# Patient Record
(demographics unavailable — no encounter records)

---

## 2024-10-07 NOTE — ASSESSMENT
[With Patient/Caregiver] : With Patient/Caregiver [Designated Health Care Proxy] : Designated Health Care Proxy [DNR] : DNR [DNI] : DNI [FreeTextEntry1] : Assessment: 51-year-old, developmentally delayed, day +1 cycle 7 tafa/Rev for primary refractory DLBCL.   Course complicated by large pleural effusion (re-accumulated 3/25/24), CNS disease, Course further complicated by asymptomatic absolute eosinophilia (active)    Treatment Hx:  RCHOP  (2/2/24) + MTX(3.5g/mm) 2/16/24 (pleural effusion was drained for this infusion),  RCHOP (2/21/24), IT MTX ,  RCHOP (3/17/24), IT 3/28/24 L.P. (2/1/24): total protein 124. This is a marked decrease from Dx and supports CNS involvement of lymphoma at Dx. RCHOP (cycle 4): 4/4/24 Tafa/Rev: 4/15/24 -  Hussein will not consider hospitalization -- even for one day and therefore his decision maker does not wish to change to bi-specific therapy  PMHx: HTN, AODM, left AKA, right transmetarsal amputation, diabetic neuropathy.   Plan: Heme: Continue Tafa/Rev - rev at reduced dose given pre-existing neuropathy from DM. 14days/month Tafa days cycles 4 - 12 are q 14 days ASA 81mg daily  We are no longer pursing IT MTX.   Clonoseq: clone tracking is not feasible.   Radiographs: CT (1/30/24) large left pleural effusion. brain MRI (2/5/24): negative. CT (03/28/2024) -- before cycle 4 RCHOP CT (06/27/2024) -- pended below   DM: A1c 9.7%, As per brother he is going to stop by PCP office today for insulin refill   Code Status: discussed code status with Hussein and his brother: code status: DNR/DNI  Addendum I: CT (06/27/24):   COMPARISON: CT 3/28/2024 and 1/25/2024  FINDINGS: CHEST: LUNGS AND LARGE AIRWAYS: Mild secretions within the trachea. Atelectasis within the left lower lobe. PLEURA: Moderate left pleural effusion, decreased from 3/28/2024. VESSELS: Within normal limits. HEART: Heart size is normal. No pericardial effusion. MEDIASTINUM AND RAMIREZ: No lymphadenopathy. CHEST WALL AND LOWER NECK: Within normal limits.  ABDOMEN AND PELVIS: LIVER: Within normal limits. BILE DUCTS: Normal caliber. GALLBLADDER: Within normal limits. SPLEEN: Hypodense splenic mass measures 3.6 x 3.5 cm, previously 4.8 x 4.8 cm. PANCREAS: Within normal limits. ADRENALS: Normal right adrenal gland. Left adrenal gland nodule is confluent with the left retroperitoneal soft tissue described below. KIDNEYS/URETERS: Within normal limits.  BLADDER: Within normal limits. REPRODUCTIVE ORGANS: Prostate within normal limits.  BOWEL: No bowel obstruction. Appendix is normal PERITONEUM/RETROPERITONEUM: Continued interval decrease in left retroperitoneal soft tissue measures 5.7 x 1.4 cm, previously 6.6 x 2.4 cm. VESSELS: Within normal limits. LYMPH NODES: No lymphadenopathy. ABDOMINAL WALL: Small fat-containing umbilical hernia. BONES: Degenerative changes of the spine.  IMPRESSION: *  Continued interval decrease in size of splenic mass and left retroperitoneal soft tissue, consistent with positive response to treatment. *  Moderate left pleural effusion, decreased.   [AdvancecareDate] : 07/29/24

## 2024-10-07 NOTE — HISTORY OF PRESENT ILLNESS
[de-identified] : Hussein was last seen: 9/2324 Presents with his brother: decision maker.  [de-identified] : Reason for visit: refractory DLBCL  Since last visit: Reports he has been feeling well. Appetite is good. Ran out of Humalog insulin 2 days ago.  Hussein does not spontaneously report: fever, chills, sweats, weight loss, skin rashes, petechiae, bruising, eye irritation, hearing loss, mouth sores, sore throat, hemoptysis, pleuritic chest pain, dyspnea, change in vision, focal numbness/weakness, chest pains, palpitations, nausea, vomiting, diarrhea, constipation, dysuria, hematuria, bowel or bladder incontinence, sever joint pain, back pain.   Medications Tafa/ Rev 10mg x14 days (last rev yesterday) Humalog bid varying dosing Lantus and Trulicity ASA 81mg daily  Examination: Hussein is in no acute distress No occiput, poster cervical, anterior cervical, submandibular, sublingual, submental, supraclavicular nor axillary adenopathy Lungs: CTA with breath sounds throughout (improved) Cardiac: without rubs Abd: soft and non-tender, Traube's space is tympanitic No inguinal nor femoral adenopathy Gait: using a walker.

## 2024-10-21 NOTE — HISTORY OF PRESENT ILLNESS
[de-identified] : Hussein was last seen: 10/7/24 Presents with his brother: decision maker.  [de-identified] : Reason for visit: refractory DLBCL  Since last visit: no change in condition   Hussein does not spontaneously report: fever, chills, sweats, weight loss, skin rashes, petechiae, bruising, eye irritation, hearing loss, mouth sores, sore throat, hemoptysis, pleuritic chest pain, dyspnea, change in vision, focal numbness/weakness, chest pains, palpitations, nausea, vomiting, diarrhea, constipation, dysuria, hematuria, bowel or bladder incontinence, sever joint pain, back pain.   Medications Tafa/ Rev 10mg x14 days (last rev yesterday) Humalog bid varying dosing Lantus and Trulicity ASA 81mg daily  Examination: Hussein is in no acute distress No occiput, poster cervical, anterior cervical, submandibular, sublingual, submental, supraclavicular nor axillary adenopathy Lungs: CTA with breath sounds throughout Cardiac: without rubs Abd: soft and non-tender, Traube's space is tympanitic No inguinal nor femoral adenopathy Gait: using a walker.

## 2024-10-21 NOTE — ASSESSMENT
[With Patient/Caregiver] : With Patient/Caregiver [Designated Health Care Proxy] : Designated Health Care Proxy [DNR] : DNR [DNI] : DNI [FreeTextEntry1] : Assessment: 51-year-old, developmentally delayed, day +14 cycle 7 tafa/Rev for primary refractory DLBCL.   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 (2/1/24): total protein 124. This is a marked decrease from Dx and supports CNS involvement of lymphoma at Dx. RCHOP (cycle 4): 4/4/24 Tafa/Rev: 4/15/24 -  Hussein will not consider hospitalization -- even for one day and therefore his decision maker does not wish to change to bi-specific therapy  PMHx: HTN, AODM, left AKA, right transmetarsal amputation, diabetic neuropathy.   Plan: Heme: Continue Tafa/Rev - rev at reduced dose given pre-existing neuropathy from DM. 14days/month Tafa days cycles 4 - 12 are q 14 days ASA 81mg daily  We are no longer pursing IT MTX.   Clonoseq: clone tracking is not feasible. No intervention nor further work-up (at this time) for his mild AEC.   Radiographs: CT (1/30/24) large left pleural effusion. brain MRI (2/5/24): negative. CT (03/28/2024) -- before cycle 4 RCHOP CT (06/27/2024) -- pended below   DM: being managed by PCP  Code Status:  DNR/DNI  Over 35 minutes were spent in direct patient care with greater than 50% discussing his ROS.  Addendum I: CT (06/27/24):   COMPARISON: CT 3/28/2024 and 1/25/2024  FINDINGS: CHEST: LUNGS AND LARGE AIRWAYS: Mild secretions within the trachea. Atelectasis within the left lower lobe. PLEURA: Moderate left pleural effusion, decreased from 3/28/2024. VESSELS: Within normal limits. HEART: Heart size is normal. No pericardial effusion. MEDIASTINUM AND RAMIREZ: No lymphadenopathy. CHEST WALL AND LOWER NECK: Within normal limits.  ABDOMEN AND PELVIS: LIVER: Within normal limits. BILE DUCTS: Normal caliber. GALLBLADDER: Within normal limits. SPLEEN: Hypodense splenic mass measures 3.6 x 3.5 cm, previously 4.8 x 4.8 cm. PANCREAS: Within normal limits. ADRENALS: Normal right adrenal gland. Left adrenal gland nodule is confluent with the left retroperitoneal soft tissue described below. KIDNEYS/URETERS: Within normal limits.  BLADDER: Within normal limits. REPRODUCTIVE ORGANS: Prostate within normal limits.  BOWEL: No bowel obstruction. Appendix is normal PERITONEUM/RETROPERITONEUM: Continued interval decrease in left retroperitoneal soft tissue measures 5.7 x 1.4 cm, previously 6.6 x 2.4 cm. VESSELS: Within normal limits. LYMPH NODES: No lymphadenopathy. ABDOMINAL WALL: Small fat-containing umbilical hernia. BONES: Degenerative changes of the spine.  IMPRESSION: *  Continued interval decrease in size of splenic mass and left retroperitoneal soft tissue, consistent with positive response to treatment. *  Moderate left pleural effusion, decreased.   [AdvancecareDate] : 07/29/24

## 2024-10-21 NOTE — HISTORY OF PRESENT ILLNESS
[de-identified] : Hussein was last seen: 10/7/24 Presents with his brother: decision maker.  [de-identified] : Reason for visit: refractory DLBCL  Since last visit: no change in condition   Hussein does not spontaneously report: fever, chills, sweats, weight loss, skin rashes, petechiae, bruising, eye irritation, hearing loss, mouth sores, sore throat, hemoptysis, pleuritic chest pain, dyspnea, change in vision, focal numbness/weakness, chest pains, palpitations, nausea, vomiting, diarrhea, constipation, dysuria, hematuria, bowel or bladder incontinence, sever joint pain, back pain.   Medications Tafa/ Rev 10mg x14 days (last rev yesterday) Humalog bid varying dosing Lantus and Trulicity ASA 81mg daily  Examination: Hussein is in no acute distress No occiput, poster cervical, anterior cervical, submandibular, sublingual, submental, supraclavicular nor axillary adenopathy Lungs: CTA with breath sounds throughout Cardiac: without rubs Abd: soft and non-tender, Traube's space is tympanitic No inguinal nor femoral adenopathy Gait: using a walker.

## 2024-11-04 NOTE — HISTORY OF PRESENT ILLNESS
[FreeTextEntry1] : Follow Up [de-identified] : Hussein Mcginnis is a 51 year old with a PMH of DM2, HTN, HLD, PAD presenting for follow  up. T2DM:  Pt is currently on 80 of humalin at night, and then 80 of humalin in the day if high(BG >200). No recent hypoglycemic events. Pt has a great appetitie. Pt checks their BG about 1x a day in the AM, on average pt BG is in the mid 200s. Pt was on metformin a few yrs ago pt did not tolerate. Pt was on trulicity 1.5 and ran out about 6-7 months ago.

## 2024-11-04 NOTE — PHYSICAL EXAM
[No Acute Distress] : no acute distress [Well Nourished] : well nourished [Normal Sclera/Conjunctiva] : normal sclera/conjunctiva [PERRL] : pupils equal round and reactive to light [No Respiratory Distress] : no respiratory distress  [No Accessory Muscle Use] : no accessory muscle use [Normal Rate] : normal rate  [Regular Rhythm] : with a regular rhythm [Soft] : abdomen soft [Non Tender] : non-tender [No HSM] : no HSM [No Rash] : no rash [Coordination Grossly Intact] : coordination grossly intact [No Focal Deficits] : no focal deficits [Speech Grossly Normal] : speech grossly normal [Memory Grossly Normal] : memory grossly normal [Normal Mood] : the mood was normal [Normal Insight/Judgement] : insight and judgment were intact [de-identified] : 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

## 2024-11-04 NOTE — PHYSICAL EXAM
[No Acute Distress] : no acute distress [Well Nourished] : well nourished [Normal Sclera/Conjunctiva] : normal sclera/conjunctiva [PERRL] : pupils equal round and reactive to light [No Respiratory Distress] : no respiratory distress  [No Accessory Muscle Use] : no accessory muscle use [Normal Rate] : normal rate  [Regular Rhythm] : with a regular rhythm [Soft] : abdomen soft [Non Tender] : non-tender [No HSM] : no HSM [No Rash] : no rash [Coordination Grossly Intact] : coordination grossly intact [No Focal Deficits] : no focal deficits [Speech Grossly Normal] : speech grossly normal [Memory Grossly Normal] : memory grossly normal [Normal Mood] : the mood was normal [Normal Insight/Judgement] : insight and judgment were intact [de-identified] : 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

## 2024-11-04 NOTE — PLAN
[FreeTextEntry1] : HCM -Hep B/HIV/Hep C negative    -Colonscopy ordered  -Tdap(2017)/Flu shot today -ASCVD 12.8%, will send lipitor 40  - PCV and VZV in the future  Can RTC in 5 wks Seen with Dr. Canales

## 2024-11-04 NOTE — HISTORY OF PRESENT ILLNESS
[FreeTextEntry1] : Follow Up [de-identified] : Hussein Mcginnis is a 51 year old with a PMH of DM2, HTN, HLD, PAD presenting for follow  up. T2DM:  Pt is currently on 80 of humalin at night, and then 80 of humalin in the day if high(BG >200). No recent hypoglycemic events. Pt has a great appetitie. Pt checks their BG about 1x a day in the AM, on average pt BG is in the mid 200s. Pt was on metformin a few yrs ago pt did not tolerate. Pt was on trulicity 1.5 and ran out about 6-7 months ago.

## 2024-11-04 NOTE — END OF VISIT
[] : Resident [FreeTextEntry3] : a1c above goal. on humulin R BID; NOT using lantus. ran out of GLP1RA.  resume GLP1RA. CGM ordered for further data to help with insulin titration.  - can consider switching humulin R to basal/bolus insulin or 70/30 mix if FS are not being managed well after resuming GLP1RA. close f/u with PCP.

## 2024-11-18 NOTE — ASSESSMENT
[With Patient/Caregiver] : With Patient/Caregiver [Designated Health Care Proxy] : Designated Health Care Proxy [DNR] : DNR [DNI] : DNI [FreeTextEntry1] : Assessment: 52-year-old, developmentally delayed, day +15 cycle 8 tafa/Rev for primary refractory DLBCL.   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 (2/1/24): total protein 124. This is a marked decrease from Dx and supports CNS involvement of lymphoma at Dx. RCHOP (cycle 4): 4/4/24 Tafa/Rev: 4/15/24 -  Hussein will not consider hospitalization -- even for one day and therefore his decision maker does not wish to change to bi-specific therapy  PMHx: HTN, AODM, left AKA, right transmetarsal amputation, diabetic neuropathy.   Plan: Heme: Continue Tafa/Rev - rev at reduced dose given pre-existing neuropathy from DM. 14days/month Tafa days cycles 4 - 12 are q 14 days ASA 81mg daily  We are no longer pursing IT MTX.   Clonoseq: clone tracking is not feasible. No intervention nor further work-up (at this time) for his mild AEC.   Radiographs: CT (1/30/24) large left pleural effusion. brain MRI (2/5/24): negative. CT (03/28/2024) -- before cycle 4 RCHOP CT (06/27/2024) -- pended below   DM: being managed by PCP  Code Status:  DNR/DNI  Over 35 minutes were spent in direct patient care with greater than 50% discussing his ROS.  Addendum I: CT (06/27/24):   COMPARISON: CT 3/28/2024 and 1/25/2024  FINDINGS: CHEST: LUNGS AND LARGE AIRWAYS: Mild secretions within the trachea. Atelectasis within the left lower lobe. PLEURA: Moderate left pleural effusion, decreased from 3/28/2024. VESSELS: Within normal limits. HEART: Heart size is normal. No pericardial effusion. MEDIASTINUM AND RAMIREZ: No lymphadenopathy. CHEST WALL AND LOWER NECK: Within normal limits.  ABDOMEN AND PELVIS: LIVER: Within normal limits. BILE DUCTS: Normal caliber. GALLBLADDER: Within normal limits. SPLEEN: Hypodense splenic mass measures 3.6 x 3.5 cm, previously 4.8 x 4.8 cm. PANCREAS: Within normal limits. ADRENALS: Normal right adrenal gland. Left adrenal gland nodule is confluent with the left retroperitoneal soft tissue described below. KIDNEYS/URETERS: Within normal limits.  BLADDER: Within normal limits. REPRODUCTIVE ORGANS: Prostate within normal limits.  BOWEL: No bowel obstruction. Appendix is normal PERITONEUM/RETROPERITONEUM: Continued interval decrease in left retroperitoneal soft tissue measures 5.7 x 1.4 cm, previously 6.6 x 2.4 cm. VESSELS: Within normal limits. LYMPH NODES: No lymphadenopathy. ABDOMINAL WALL: Small fat-containing umbilical hernia. BONES: Degenerative changes of the spine.  IMPRESSION: *  Continued interval decrease in size of splenic mass and left retroperitoneal soft tissue, consistent with positive response to treatment. *  Moderate left pleural effusion, decreased.   [AdvancecareDate] : 07/29/24

## 2024-11-18 NOTE — HISTORY OF PRESENT ILLNESS
[de-identified] : Hussein was last seen: 10/21/24 Presents with his brother: decision maker.  [de-identified] : Reason for visit: refractory DLBCL  Since last visit: no change in condition   Hussein does not spontaneously report: fever, chills, sweats, weight loss, skin rashes, petechiae, bruising, eye irritation, hearing loss, mouth sores, sore throat, hemoptysis, pleuritic chest pain, dyspnea, change in vision, focal numbness/weakness, chest pains, palpitations, nausea, vomiting, diarrhea, constipation, dysuria, hematuria, bowel or bladder incontinence, sever joint pain, back pain.   Medications Tafa/ Rev 10mg x14 days (last rev yesterday) Humalog bid varying dosing Lantus and Trulicity ASA 81mg daily Trulicity Lipitor - but not yet started  Examination: Hussein is in no acute distress No occiput, poster cervical, anterior cervical, submandibular, sublingual, submental, supraclavicular nor axillary adenopathy Lungs: CTA with breath sounds throughout Cardiac: without rubs Abd: soft and non-tender, Traube's space is tympanitic No inguinal nor femoral adenopathy Gait: using a walker.

## 2024-11-18 NOTE — HISTORY OF PRESENT ILLNESS
[de-identified] : Hussein was last seen: 10/21/24 Presents with his brother: decision maker.  [de-identified] : Reason for visit: refractory DLBCL  Since last visit: no change in condition   Hussein does not spontaneously report: fever, chills, sweats, weight loss, skin rashes, petechiae, bruising, eye irritation, hearing loss, mouth sores, sore throat, hemoptysis, pleuritic chest pain, dyspnea, change in vision, focal numbness/weakness, chest pains, palpitations, nausea, vomiting, diarrhea, constipation, dysuria, hematuria, bowel or bladder incontinence, sever joint pain, back pain.   Medications Tafa/ Rev 10mg x14 days (last rev yesterday) Humalog bid varying dosing Lantus and Trulicity ASA 81mg daily Trulicity Lipitor - but not yet started  Examination: Hussein is in no acute distress No occiput, poster cervical, anterior cervical, submandibular, sublingual, submental, supraclavicular nor axillary adenopathy Lungs: CTA with breath sounds throughout Cardiac: without rubs Abd: soft and non-tender, Traube's space is tympanitic No inguinal nor femoral adenopathy Gait: using a walker.

## 2024-12-02 NOTE — ASSESSMENT
[With Patient/Caregiver] : With Patient/Caregiver [Designated Health Care Proxy] : Designated Health Care Proxy [DNR] : DNR [DNI] : DNI [FreeTextEntry1] : Assessment: 52-year-old, developmentally delayed, day +1 cycle 9 tafa/Rev for primary refractory DLBCL.   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 (2/1/24): total protein 124. This is a marked decrease from Dx and supports CNS involvement of lymphoma at Dx. RCHOP (cycle 4): 4/4/24 Tafa/Rev: 4/15/24 -  Hussein will not consider hospitalization -- even for one day and therefore his decision maker does not wish to change to bi-specific therapy  PMHx: HTN, AODM, left AKA, right transmetarsal amputation, diabetic neuropathy.   Plan: Heme: Continue Tafa/Rev - rev at reduced dose given pre-existing neuropathy from DM. 14days/month Tafa days cycles 4 - 12 are q 14 days ASA 81mg daily  We are no longer pursing IT MTX.   Clonoseq: clone tracking is not feasible. No intervention nor further work-up (at this time) for his mild AEC.   Radiographs: CT (1/30/24) large left pleural effusion. brain MRI (2/5/24): negative. CT (03/28/2024) -- before cycle 4 RCHOP CT (06/27/2024) -- pended below   DM: being managed by PCP  Code Status:  DNR/DNI   Addendum I: CT (06/27/24):   COMPARISON: CT 3/28/2024 and 1/25/2024  FINDINGS: CHEST: LUNGS AND LARGE AIRWAYS: Mild secretions within the trachea. Atelectasis within the left lower lobe. PLEURA: Moderate left pleural effusion, decreased from 3/28/2024. VESSELS: Within normal limits. HEART: Heart size is normal. No pericardial effusion. MEDIASTINUM AND RAMIREZ: No lymphadenopathy. CHEST WALL AND LOWER NECK: Within normal limits.  ABDOMEN AND PELVIS: LIVER: Within normal limits. BILE DUCTS: Normal caliber. GALLBLADDER: Within normal limits. SPLEEN: Hypodense splenic mass measures 3.6 x 3.5 cm, previously 4.8 x 4.8 cm. PANCREAS: Within normal limits. ADRENALS: Normal right adrenal gland. Left adrenal gland nodule is confluent with the left retroperitoneal soft tissue described below. KIDNEYS/URETERS: Within normal limits.  BLADDER: Within normal limits. REPRODUCTIVE ORGANS: Prostate within normal limits.  BOWEL: No bowel obstruction. Appendix is normal PERITONEUM/RETROPERITONEUM: Continued interval decrease in left retroperitoneal soft tissue measures 5.7 x 1.4 cm, previously 6.6 x 2.4 cm. VESSELS: Within normal limits. LYMPH NODES: No lymphadenopathy. ABDOMINAL WALL: Small fat-containing umbilical hernia. BONES: Degenerative changes of the spine.  IMPRESSION: *  Continued interval decrease in size of splenic mass and left retroperitoneal soft tissue, consistent with positive response to treatment. *  Moderate left pleural effusion, decreased.   [AdvancecareDate] : 07/29/24

## 2024-12-02 NOTE — HISTORY OF PRESENT ILLNESS
[de-identified] : Hussein was last seen: 11/18/24 Presents with his brother: decision maker.  [de-identified] : Reason for visit: refractory DLBCL  Since last visit: Feeling well   Hussein does not spontaneously report: fever, chills, sweats, weight loss, skin rashes, petechiae, bruising, eye irritation, hearing loss, mouth sores, sore throat, hemoptysis, pleuritic chest pain, dyspnea, change in vision, focal numbness/weakness, chest pains, palpitations, nausea, vomiting, diarrhea, constipation, dysuria, hematuria, bowel or bladder incontinence, sever joint pain, back pain.   Medications Tafa/ Rev 10mg x14 days (last rev yesterday) Humalog bid varying dosing Lantus  Trulicity 1.5 MG/0.5ML Sub ASA 81mg daily Lipitor 40mg daily   Examination: Hussein is in no acute distress No occiput, poster cervical, anterior cervical, submandibular, sublingual, submental, supraclavicular nor axillary adenopathy Lungs: CTA with breath sounds throughout Cardiac: without rubs Abd: soft and non-tender, Traube's space is tympanitic No inguinal nor femoral adenopathy Gait: using a walker.

## 2024-12-16 NOTE — HISTORY OF PRESENT ILLNESS
[de-identified] : Hussein was last seen: 12/2/24 Presents with his brother: decision maker.  [de-identified] : Reason for visit: refractory DLBCL  Since last visit: Feeling well   Hussein does not spontaneously report: fever, chills, sweats, weight loss, skin rashes, petechiae, bruising, eye irritation, hearing loss, mouth sores, sore throat, hemoptysis, pleuritic chest pain, dyspnea, change in vision, focal numbness/weakness, chest pains, palpitations, nausea, vomiting, diarrhea, constipation, dysuria, hematuria, bowel or bladder incontinence, sever joint pain, back pain.   Medications Tafa/ Rev 10mg x14 days (last rev yesterday) Humalog bid varying dosing Lantus  Trulicity 1.5 MG/0.5ML Sub ASA 81mg daily Lipitor 40mg daily   Examination: Hussein is in no acute distress No occiput, poster cervical, anterior cervical, submandibular, sublingual, submental, supraclavicular nor axillary adenopathy Lungs: CTA with breath sounds throughout Cardiac: without rubs Abd: soft and non-tender, Traube's space is tympanitic No inguinal nor femoral adenopathy Gait: using a walker.    12/16/24: WBC 7.4, Hgb 11.6, ,000

## 2024-12-16 NOTE — ASSESSMENT
[With Patient/Caregiver] : With Patient/Caregiver [Designated Health Care Proxy] : Designated Health Care Proxy [DNR] : DNR [DNI] : DNI [FreeTextEntry1] : Assessment: 52-year-old, developmentally delayed, day +15 cycle 9 tafa/Rev for primary refractory DLBCL.   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 (2/1/24): total protein 124. This is a marked decrease from Dx and supports CNS involvement of lymphoma at Dx. RCHOP (cycle 4): 4/4/24 Tafa/Rev: 4/15/24 -  Hussein will not consider hospitalization -- even for one day and therefore his decision maker does not wish to change to bi-specific therapy  PMHx: HTN, AODM, left AKA, right transmetarsal amputation, diabetic neuropathy.   Plan: Heme: Continue Tafa/Rev - rev at reduced dose given pre-existing neuropathy from DM. 14days/month Tafa days cycles 4 - 12 are q 14 days ASA 81mg daily  We are no longer pursing IT MTX.   Clonoseq: clone tracking is not feasible. No intervention nor further work-up (at this time) for his mild AEC.   Radiographs: CT (1/30/24) large left pleural effusion. brain MRI (2/5/24): negative. CT (03/28/2024) -- before cycle 4 RCHOP CT (06/27/2024) -- pended below  CT (12/16/24):: requested  ID: flu vaccine this season.  DM: being managed by PCP  Code Status:  DNR/DNI  Over 45 minutes were spent in direct patient care with greater than 50% discussing his disease status.  Addendum I: CT (06/27/24):   COMPARISON: CT 3/28/2024 and 1/25/2024  FINDINGS: CHEST: LUNGS AND LARGE AIRWAYS: Mild secretions within the trachea. Atelectasis within the left lower lobe. PLEURA: Moderate left pleural effusion, decreased from 3/28/2024. VESSELS: Within normal limits. HEART: Heart size is normal. No pericardial effusion. MEDIASTINUM AND RAMIREZ: No lymphadenopathy. CHEST WALL AND LOWER NECK: Within normal limits.  ABDOMEN AND PELVIS: LIVER: Within normal limits. BILE DUCTS: Normal caliber. GALLBLADDER: Within normal limits. SPLEEN: Hypodense splenic mass measures 3.6 x 3.5 cm, previously 4.8 x 4.8 cm. PANCREAS: Within normal limits. ADRENALS: Normal right adrenal gland. Left adrenal gland nodule is confluent with the left retroperitoneal soft tissue described below. KIDNEYS/URETERS: Within normal limits.  BLADDER: Within normal limits. REPRODUCTIVE ORGANS: Prostate within normal limits.  BOWEL: No bowel obstruction. Appendix is normal PERITONEUM/RETROPERITONEUM: Continued interval decrease in left retroperitoneal soft tissue measures 5.7 x 1.4 cm, previously 6.6 x 2.4 cm. VESSELS: Within normal limits. LYMPH NODES: No lymphadenopathy. ABDOMINAL WALL: Small fat-containing umbilical hernia. BONES: Degenerative changes of the spine.  IMPRESSION: *  Continued interval decrease in size of splenic mass and left retroperitoneal soft tissue, consistent with positive response to treatment. *  Moderate left pleural effusion, decreased.   [AdvancecareDate] : 07/29/24

## 2025-01-13 NOTE — ASSESSMENT
[With Patient/Caregiver] : With Patient/Caregiver [Designated Health Care Proxy] : Designated Health Care Proxy [DNR] : DNR [DNI] : DNI [FreeTextEntry1] : Assessment: 52-year-old, developmentally delayed, day +15 cycle 10 tafa/Rev for primary refractory DLBCL.   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 (2/1/24): total protein 124. This is a marked decrease from Dx and supports CNS involvement of lymphoma at Dx. RCHOP (cycle 4): 4/4/24 Tafa/Rev: 4/15/24 -  Hussein will not consider hospitalization -- even for one day and therefore his decision maker does not wish to change to bi-specific therapy  PMHx: HTN, AODM, left AKA, right transmetarsal amputation, diabetic neuropathy.   Plan: Heme: Continue Tafa/Rev - rev at reduced dose given pre-existing neuropathy from DM. 14days/month until C12  Tafa days cycles 4 - 12 are q 14 days ASA 81mg daily  We are no longer pursing IT MTX.   Clonoseq: clone tracking is not feasible. No intervention nor further work-up (at this time) for his mild AEC.   Radiographs: CT (1/30/24) large left pleural effusion. brain MRI (2/5/24): negative. CT (03/28/2024) -- before cycle 4 RCHOP CT (12/16/24):: pended below   ID: flu vaccine this season.  DM: being managed by PCP  Back Pain: UA to r/o UTI  Zanaflex PRN   Code Status:  DNR/DNI  Addendum I CT (12/30/2024) FINDINGS:  Evaluation of the solid visceral organs and vasculature is limited without the administration of intravenous contrast.  CHEST: LUNGS AND LARGE AIRWAYS: Patent central airways. A punctate left upper lobe calcified granuloma. PLEURA: A trace left pleural effusion, further decreased from moderate on 6/27/2024. VESSELS: Mild atherosclerotic changes of a normal caliber thoracic aorta. Main pulmonary artery is normal in caliber. HEART: Heart size is normal. No pericardial effusion. Coronary artery calcifications. MEDIASTINUM AND RAMIREZ: No lymphadenopathy. CHEST WALL AND LOWER NECK: Bilateral gynecomastia.  ABDOMEN AND PELVIS: LIVER: Within normal limits. BILE DUCTS: Normal caliber. GALLBLADDER: Within normal limits. SPLEEN: Redemonstrated posterior splenic lesion now measuring 3.5 x 3.2 x 2.9 cm (2:61), mildly decreased from 3.6 x 3.5 x 3.4 cm on 6/27/2024. PANCREAS: Within normal limits. ADRENALS: Normal right adrenal gland. As on prior, the left adrenal gland is confluent with the left retroperitoneal soft tissue. KIDNEYS/URETERS: Within normal limits.  BLADDER: Within normal limits. REPRODUCTIVE ORGANS: Normal-sized prostate.  BOWEL: No bowel obstruction. Appendix is not visualized. No evidence of inflammation in the pericecal region. PERITONEUM/RETROPERITONEUM: Further decrease in the left retroperitoneal soft tissue now measuring 5.2 x 0.9 cm (2:80), previously 5.7 x 1.4 cm. VESSELS: Atherosclerotic changes. LYMPH NODES: No lymphadenopathy. ABDOMINAL WALL: A small fat-containing umbilical hernia. Small bilateral fat-containing inguinal hernias. BONES: Degenerative changes.  IMPRESSION: Limited noncontrast study.  Since 6/27/2024, there has been further decrease in the left retroperitoneal soft tissue, mild further decrease in a splenic mass and further decrease in a now trace left pleural effusion.   [AdvancecareDate] : 07/29/24

## 2025-01-13 NOTE — HISTORY OF PRESENT ILLNESS
[de-identified] : Hussein was last seen: 12/16/24 Presents with his brother: decision maker.  [de-identified] : Reason for visit: refractory DLBCL  Since last visit: LBP x2-3 weeks has been using Aleve which has not helped much. Denies urinary urgency or frequency.   Hussein does not spontaneously report: fever, chills, sweats, weight loss, skin rashes, petechiae, bruising, eye irritation, hearing loss, mouth sores, sore throat, hemoptysis, pleuritic chest pain, dyspnea, change in vision, focal numbness/weakness, chest pains, palpitations, nausea, vomiting, diarrhea, constipation, dysuria, hematuria, bowel or bladder incontinence, sever joint pain, back pain.   Medications Tafa/ Rev 10mg x14 days (last rev yesterday) Humalog bid varying dosing Lantus  Trulicity 1.5 MG/0.5ML Sub ASA 81mg daily   Examination: Hussein is in no acute distress No occiput, poster cervical, anterior cervical, submandibular, sublingual, submental, supraclavicular nor axillary adenopathy Lungs: CTA with breath sounds throughout Cardiac: without rubs Abd: soft and non-tender, Traube's space is tympanitic No inguinal nor femoral adenopathy Gait: using a walker.    12/16/24: WBC 7.4, Hgb 11.6, ,000 01/13/25: WBC 10.5, Hgb 12.9, ,000

## 2025-02-10 NOTE — ASSESSMENT
[With Patient/Caregiver] : With Patient/Caregiver [Designated Health Care Proxy] : Designated Health Care Proxy [DNR] : DNR [DNI] : DNI [FreeTextEntry1] : Assessment: 52-year-old, developmentally delayed, day +15 cycle 11 tafa/Rev for primary refractory DLBCL.   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 (2/1/24): total protein 124. This is a marked decrease from Dx and supports CNS involvement of lymphoma at Dx. RCHOP (cycle 4): 4/4/24 Tafa/Rev: 4/15/24 -  Hussein will not consider hospitalization -- even for one day and therefore his decision maker does not wish to change to bi-specific therapy  PMHx: HTN, AODM, left AKA, right transmetarsal amputation, diabetic neuropathy.   Plan: Heme: Continue Tafa/Rev - rev at reduced dose given pre-existing neuropathy from DM. 14days/month until C12  Tafa for cycles 4 - 12 are q 14 days ASA 81mg daily  Clonoseq: clone tracking is not feasible.   Radiographs: CT (1/30/24) large left pleural effusion. brain MRI (2/5/24): negative. CT (03/28/2024) -- before cycle 4 RCHOP CT (12/16/24):: pended below   ID: flu vaccine this season.  DM: being managed by PCP Encouraged to resume Lantus as prescribed   Code Status:  DNR/DNI  Addendum I CT (12/30/2024) FINDINGS:  Evaluation of the solid visceral organs and vasculature is limited without the administration of intravenous contrast.  CHEST: LUNGS AND LARGE AIRWAYS: Patent central airways. A punctate left upper lobe calcified granuloma. PLEURA: A trace left pleural effusion, further decreased from moderate on 6/27/2024. VESSELS: Mild atherosclerotic changes of a normal caliber thoracic aorta. Main pulmonary artery is normal in caliber. HEART: Heart size is normal. No pericardial effusion. Coronary artery calcifications. MEDIASTINUM AND RAMIREZ: No lymphadenopathy. CHEST WALL AND LOWER NECK: Bilateral gynecomastia.  ABDOMEN AND PELVIS: LIVER: Within normal limits. BILE DUCTS: Normal caliber. GALLBLADDER: Within normal limits. SPLEEN: Redemonstrated posterior splenic lesion now measuring 3.5 x 3.2 x 2.9 cm (2:61), mildly decreased from 3.6 x 3.5 x 3.4 cm on 6/27/2024. PANCREAS: Within normal limits. ADRENALS: Normal right adrenal gland. As on prior, the left adrenal gland is confluent with the left retroperitoneal soft tissue. KIDNEYS/URETERS: Within normal limits.  BLADDER: Within normal limits. REPRODUCTIVE ORGANS: Normal-sized prostate.  BOWEL: No bowel obstruction. Appendix is not visualized. No evidence of inflammation in the pericecal region. PERITONEUM/RETROPERITONEUM: Further decrease in the left retroperitoneal soft tissue now measuring 5.2 x 0.9 cm (2:80), previously 5.7 x 1.4 cm. VESSELS: Atherosclerotic changes. LYMPH NODES: No lymphadenopathy. ABDOMINAL WALL: A small fat-containing umbilical hernia. Small bilateral fat-containing inguinal hernias. BONES: Degenerative changes.  IMPRESSION: Limited noncontrast study.  Since 6/27/2024, there has been further decrease in the left retroperitoneal soft tissue, mild further decrease in a splenic mass and further decrease in a now trace left pleural effusion.   [AdvancecareDate] : 07/29/24

## 2025-02-10 NOTE — HISTORY OF PRESENT ILLNESS
[de-identified] : Hussein was last seen: 1/13/25 Presents with his brother: decision maker.  [de-identified] : Reason for visit: refractory DLBCL  Since last visit: LBP has resolved, overall feeling well. Appetite is good. Glucose remains elevated fasting in the 200's, has not been taking his Lantus.   Hussein does not spontaneously report: fever, chills, sweats, weight loss, skin rashes, petechiae, bruising, eye irritation, hearing loss, mouth sores, sore throat, hemoptysis, pleuritic chest pain, dyspnea, change in vision, focal numbness/weakness, chest pains, palpitations, nausea, vomiting, diarrhea, constipation, dysuria, hematuria, bowel or bladder incontinence, sever joint pain, back pain.   Medications Tafa/ Rev 10mg x14 days (last rev yesterday) Humalog bid varying dosing Lantus  Trulicity 1.5 MG/0.5ML Sub ASA 81mg daily   Examination: Hussein is in no acute distress No occiput, poster cervical, anterior cervical, submandibular, sublingual, submental, supraclavicular nor axillary adenopathy Lungs: CTA with breath sounds throughout Cardiac: without rubs Abd: soft and non-tender, Traube's space is tympanitic No inguinal nor femoral adenopathy Gait: using a walker.    12/16/24: WBC 7.4, Hgb 11.6, ,000 01/13/25: WBC 10.5, Hgb 12.9, ,000

## 2025-02-10 NOTE — HISTORY OF PRESENT ILLNESS
[de-identified] : Hussein was last seen: 1/13/25 Presents with his brother: decision maker.  [de-identified] : Reason for visit: refractory DLBCL  Since last visit: LBP has resolved, overall feeling well. Appetite is good. Glucose remains elevated fasting in the 200's, has not been taking his Lantus.   Hussein does not spontaneously report: fever, chills, sweats, weight loss, skin rashes, petechiae, bruising, eye irritation, hearing loss, mouth sores, sore throat, hemoptysis, pleuritic chest pain, dyspnea, change in vision, focal numbness/weakness, chest pains, palpitations, nausea, vomiting, diarrhea, constipation, dysuria, hematuria, bowel or bladder incontinence, sever joint pain, back pain.   Medications Tafa/ Rev 10mg x14 days (last rev yesterday) Humalog bid varying dosing Lantus  Trulicity 1.5 MG/0.5ML Sub ASA 81mg daily   Examination: Hussein is in no acute distress No occiput, poster cervical, anterior cervical, submandibular, sublingual, submental, supraclavicular nor axillary adenopathy Lungs: CTA with breath sounds throughout Cardiac: without rubs Abd: soft and non-tender, Traube's space is tympanitic No inguinal nor femoral adenopathy Gait: using a walker.    12/16/24: WBC 7.4, Hgb 11.6, ,000 01/13/25: WBC 10.5, Hgb 12.9, ,000

## 2025-03-10 NOTE — ASSESSMENT
[With Patient/Caregiver] : With Patient/Caregiver [Designated Health Care Proxy] : Designated Health Care Proxy [DNR] : DNR [DNI] : DNI [FreeTextEntry1] : Assessment: 52-year-old, developmentally delayed, day +15 cycle 12 tafa/Rev for primary refractory DLBCL.   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 (2/1/24): total protein 124. This is a marked decrease from Dx and supports CNS involvement of lymphoma at Dx. RCHOP (cycle 4): 4/4/24 Tafa/Rev: 4/15/24 -  Hussein will not consider hospitalization -- even for one day and therefore his decision maker does not wish to change to bi-specific therapy  PMHx: HTN, AODM, left AKA, right transmetarsal amputation, diabetic neuropathy.   Plan: Heme: Single agent Tafa starting C13 Q14 days  ASA 81mg daily  Clonoseq: clone tracking is not feasible    Radiographs: CT (1/30/24) large left pleural effusion. brain MRI (2/5/24): negative. CT (03/28/2024) -- before cycle 4 RCHOP CT (12/16/24):: pended below  Plan for CT scan with the start of C13 (3/24)  ID: flu vaccine this season.  DM: being managed by PCP    Code Status:  DNR/DNI  Addendum I CT (12/30/2024) FINDINGS:  Evaluation of the solid visceral organs and vasculature is limited without the administration of intravenous contrast.  CHEST: LUNGS AND LARGE AIRWAYS: Patent central airways. A punctate left upper lobe calcified granuloma. PLEURA: A trace left pleural effusion, further decreased from moderate on 6/27/2024. VESSELS: Mild atherosclerotic changes of a normal caliber thoracic aorta. Main pulmonary artery is normal in caliber. HEART: Heart size is normal. No pericardial effusion. Coronary artery calcifications. MEDIASTINUM AND RAMIREZ: No lymphadenopathy. CHEST WALL AND LOWER NECK: Bilateral gynecomastia.  ABDOMEN AND PELVIS: LIVER: Within normal limits. BILE DUCTS: Normal caliber. GALLBLADDER: Within normal limits. SPLEEN: Redemonstrated posterior splenic lesion now measuring 3.5 x 3.2 x 2.9 cm (2:61), mildly decreased from 3.6 x 3.5 x 3.4 cm on 6/27/2024. PANCREAS: Within normal limits. ADRENALS: Normal right adrenal gland. As on prior, the left adrenal gland is confluent with the left retroperitoneal soft tissue. KIDNEYS/URETERS: Within normal limits.  BLADDER: Within normal limits. REPRODUCTIVE ORGANS: Normal-sized prostate.  BOWEL: No bowel obstruction. Appendix is not visualized. No evidence of inflammation in the pericecal region. PERITONEUM/RETROPERITONEUM: Further decrease in the left retroperitoneal soft tissue now measuring 5.2 x 0.9 cm (2:80), previously 5.7 x 1.4 cm. VESSELS: Atherosclerotic changes. LYMPH NODES: No lymphadenopathy. ABDOMINAL WALL: A small fat-containing umbilical hernia. Small bilateral fat-containing inguinal hernias. BONES: Degenerative changes.  IMPRESSION: Limited noncontrast study.  Since 6/27/2024, there has been further decrease in the left retroperitoneal soft tissue, mild further decrease in a splenic mass and further decrease in a now trace left pleural effusion.   [AdvancecareDate] : 07/29/24

## 2025-03-10 NOTE — HISTORY OF PRESENT ILLNESS
[de-identified] : Hussein was last seen: 2/10/25 Presents with his brother: decision maker.  [de-identified] : Reason for visit: refractory DLBCL  Since last visit: Overall feeling well.   Hussein does not spontaneously report: fever, chills, sweats, weight loss, skin rashes, petechiae, bruising, eye irritation, hearing loss, mouth sores, sore throat, hemoptysis, pleuritic chest pain, dyspnea, change in vision, focal numbness/weakness, chest pains, palpitations, nausea, vomiting, diarrhea, constipation, dysuria, hematuria, bowel or bladder incontinence, sever joint pain, back pain.   Medications Tafa/ Rev 10mg x14 days (last rev yesterday) Humalog bid varying dosing Lantus  Trulicity 1.5 MG/0.5ML Sub ASA 81mg daily   Examination: Hussein is in no acute distress No occiput, poster cervical, anterior cervical, submandibular, sublingual, submental, supraclavicular nor axillary adenopathy Lungs: CTA with breath sounds throughout Cardiac: without rubs Abd: soft and non-tender, Traube's space is tympanitic No inguinal nor femoral adenopathy Gait: using a walker.    12/16/24: WBC 7.4, Hgb 11.6, ,000 01/13/25: WBC 10.5, Hgb 12.9, ,000 03/10/25: WBC  7.5, Hgb 12.4, ,000

## 2025-03-21 NOTE — PHYSICAL EXAM
[No Acute Distress] : no acute distress [Well Nourished] : well nourished [Normal Sclera/Conjunctiva] : normal sclera/conjunctiva [PERRL] : pupils equal round and reactive to light [EOMI] : extraocular movements intact [No JVD] : no jugular venous distention [No Respiratory Distress] : no respiratory distress  [No Accessory Muscle Use] : no accessory muscle use [Normal Rate] : normal rate  [Regular Rhythm] : with a regular rhythm [Soft] : abdomen soft [Non Tender] : non-tender [No HSM] : no HSM [No CVA Tenderness] : no CVA  tenderness [No Rash] : no rash [Coordination Grossly Intact] : coordination grossly intact [No Focal Deficits] : no focal deficits [Normal Gait] : normal gait [Speech Grossly Normal] : speech grossly normal [Memory Grossly Normal] : memory grossly normal [Normal Affect] : the affect was normal [Normal Mood] : the mood was normal [Normal Insight/Judgement] : insight and judgment were intact [de-identified] : Left foot prosthetic

## 2025-03-21 NOTE — PLAN
[FreeTextEntry1] : HCM  -Hep B/HIV/Hep C negative  -Colonscopy ordered last visit   -Tdap(2017)/Flu shot given 11/24  -ASCVD 12.8%,  lipitor 40  - PCV due, VZV sent to pharmacy   Can RTC in 5 weeks Seen with Dr. Canales

## 2025-03-21 NOTE — HISTORY OF PRESENT ILLNESS
[FreeTextEntry1] : Follow Up  [de-identified] : Hussein Mcginnis is a 51 year old with a PMH of DM2, HTN, HLD, PAD presenting for follow up.  T2DM: BG checked about 1x a day in the morning averaging about 200-300. Pt taking 3mg of Trulicity once a week, 80 Humalin QHS with 80 Humalin if BG<200 and 100 Humalin if BG >200. Pt reports no hypoglycemia events or symptoms.

## 2025-03-21 NOTE — HISTORY OF PRESENT ILLNESS
[FreeTextEntry1] : Follow Up  [de-identified] : Hussein Mcginnis is a 51 year old with a PMH of DM2, HTN, HLD, PAD presenting for follow up.  T2DM: BG checked about 1x a day in the morning averaging about 200-300. Pt taking 3mg of Trulicity once a week, 80 Humalin QHS with 80 Humalin if BG<200 and 100 Humalin if BG >200. Pt reports no hypoglycemia events or symptoms.

## 2025-03-21 NOTE — PHYSICAL EXAM
[No Acute Distress] : no acute distress [Well Nourished] : well nourished [Normal Sclera/Conjunctiva] : normal sclera/conjunctiva [PERRL] : pupils equal round and reactive to light [EOMI] : extraocular movements intact [No JVD] : no jugular venous distention [No Respiratory Distress] : no respiratory distress  [No Accessory Muscle Use] : no accessory muscle use [Normal Rate] : normal rate  [Regular Rhythm] : with a regular rhythm [Soft] : abdomen soft [Non Tender] : non-tender [No HSM] : no HSM [No CVA Tenderness] : no CVA  tenderness [No Rash] : no rash [Coordination Grossly Intact] : coordination grossly intact [No Focal Deficits] : no focal deficits [Normal Gait] : normal gait [Speech Grossly Normal] : speech grossly normal [Memory Grossly Normal] : memory grossly normal [Normal Affect] : the affect was normal [Normal Mood] : the mood was normal [Normal Insight/Judgement] : insight and judgment were intact [de-identified] : Left foot prosthetic

## 2025-04-07 NOTE — HISTORY OF PRESENT ILLNESS
[de-identified] : Hussein was last seen: 3/10/25 Presents with his brother: decision maker.  [de-identified] : Reason for visit: refractory DLBCL  Since last visit: Overall feeling well. Appetite is good saw PCP who increased insulin.   Hussein does not spontaneously report: fever, chills, sweats, weight loss, skin rashes, petechiae, bruising, eye irritation, hearing loss, mouth sores, sore throat, hemoptysis, pleuritic chest pain, dyspnea, change in vision, focal numbness/weakness, chest pains, palpitations, nausea, vomiting, diarrhea, constipation, dysuria, hematuria, bowel or bladder incontinence, severe joint pain, back pain.   Medications Tafa/ Rev 10mg x14 days (last rev yesterday) 100units Humalin QHS, and then 80 Humalin if BG<200 and 100 Humalin if BG>200 Lantus  Trulicity 4.5 weekly  ASA 81mg daily  Examination: Hussein is in no acute distress No occiput, poster cervical, anterior cervical, submandibular, sublingual, submental, supraclavicular nor axillary adenopathy Lungs: CTA with breath sounds throughout Cardiac: without rubs Abd: soft and non-tender, Traube's space is tympanitic No inguinal nor femoral adenopathy Gait: using a walker.    12/16/24: WBC 7.4, Hgb 11.6, ,000 01/13/25: WBC 10.5, Hgb 12.9, ,000 03/10/25: WBC  7.5, Hgb 12.4, ,000 04/07/25: WBC 8.0,  Hgb 14.1, ,000

## 2025-04-07 NOTE — ASSESSMENT
[With Patient/Caregiver] : With Patient/Caregiver [Designated Health Care Proxy] : Designated Health Care Proxy [DNR] : DNR [DNI] : DNI [FreeTextEntry1] : Assessment: 52-year-old, developmentally delayed, day +15 cycle 13 Tafa for primary refractory DLBCL.   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 (2/1/24): total protein 124. This is a marked decrease from Dx and supports CNS involvement of lymphoma at Dx. RCHOP (cycle 4): 4/4/24 Tafa/Rev: 4/15/24 -  Hussein will not consider hospitalization -- even for one day and therefore his decision maker does not wish to change to bi-specific therapy  PMHx: HTN, AODM, left AKA, right transmetarsal amputation, diabetic neuropathy.   Plan: Heme: Single agent Tafa starting C13 Q14 days  ASA 81mg daily  Clonoseq: clone tracking is not feasible    Radiographs: CT (1/30/24) large left pleural effusion. brain MRI (2/5/24): negative. CT (03/28/2024) -- before cycle 4 RCHOP CT (12/16/24): Limited noncontrast study. Since 6/27/2024, there has been further decrease in the left retroperitoneal soft tissue, mild further decrease in a splenic mass and further decrease in a now trace left pleural effusion CT (3/24/25): After 12 cycles of Tafa/ Rev: Decrease in size of splenic lesion and retroperitoneal soft tissue density as compared to prior examination 12/30/2024. Minimal left pleural effusion, decreased in size.  ID: flu vaccine this season.  DM: being managed by PCP    Code Status:  DNR/DNI  Addendum I CT (3/24/25) CHEST: LUNGS AND LARGE AIRWAYS: Patent central airways. No pulmonary nodules. Minor atelectasis at the left lung base. PLEURA: Minimal left pleural effusion, decreased in size. VESSELS: Within normal limits. HEART: Heart size is normal. No pericardial effusion. MEDIASTINUM AND RAMIREZ: No lymphadenopathy. CHEST WALL AND LOWER NECK: Bilateral gynecomastia.  ABDOMEN AND PELVIS: LIVER: Enlarged. 21.6 cm in craniocaudal diameter. Fatty infiltration with focal sparing adjacent to the gallbladder fossa. BILE DUCTS: Normal caliber. GALLBLADDER: Layering biliary sludge. SPLEEN: Splenic lesion measuring 3.3 x 2.6 cm, decreased in size (previously 3.5 x 3.2 cm). Normal size. 10.8 cm in craniocaudal diameter. PANCREAS: Within normal limits. ADRENALS: Within normal limits. KIDNEYS/URETERS: Within normal limits.  BLADDER: Within normal limits. REPRODUCTIVE ORGANS: Enlarged prostate.  BOWEL: No bowel obstruction. Appendix is normal. PERITONEUM/RETROPERITONEUM: Retroperitoneal density inseparable from the celiac axis and left adrenal gland has decreased in size now measuring 2.7 x 0.9 cm (3.5 x 0.9 cm when remeasured along similar axes on prior exam) VESSELS: Atherosclerotic changes. LYMPH NODES: No lymphadenopathy. ABDOMINAL WALL: Fat-containing umbilical hernia. BONES: Degenerative changes.  IMPRESSION: Decrease in size of splenic lesion and retroperitoneal soft tissue density as compared to prior examination 12/30/2024  Minimal left pleural effusion, decreased in size.  Enlarged, fatty liver. [AdvancecareDate] : 07/29/24

## 2025-05-05 NOTE — HISTORY OF PRESENT ILLNESS
[de-identified] : Hussein was last seen: 4/7/25 Presents with his brother: decision maker.  [de-identified] : Reason for visit: refractory DLBCL  Since last visit: Overall feeling well.   Hussein does not spontaneously report: fever, chills, sweats, weight loss, skin rashes, petechiae, bruising, eye irritation, hearing loss, mouth sores, sore throat, hemoptysis, pleuritic chest pain, dyspnea, change in vision, focal numbness/weakness, chest pains, palpitations, nausea, vomiting, diarrhea, constipation, dysuria, hematuria, bowel or bladder incontinence, severe joint pain, back pain.   Medications Tafa (single agent) 100units Humalin QHS, and then 80 Humalin if BG<200 and 100 Humalin if BG>200 Lantus  Trulicity 4.5 weekly   Examination: Hussein is in no acute distress No occiput, poster cervical, anterior cervical, submandibular, sublingual, submental, supraclavicular nor axillary adenopathy Lungs: CTA with breath sounds throughout Cardiac: without rubs Abd: soft and non-tender, Traube's space is tympanitic No inguinal nor femoral adenopathy Gait: using a walker.    12/16/24: WBC   7.4, Hgb 11.6, ,000 01/13/25: WBC 10.5, Hgb 12.9, ,000 03/10/25: WBC  7.5,  Hgb 12.4, ,000 04/07/25: WBC  8.0,  Hgb 14.1, ,000 04/07/25: WBC  7.7,  Hgb 12.7, ,000

## 2025-05-05 NOTE — HISTORY OF PRESENT ILLNESS
[de-identified] : Hussein was last seen: 4/7/25 Presents with his brother: decision maker.  [de-identified] : Reason for visit: refractory DLBCL  Since last visit: Overall feeling well.   Hussein does not spontaneously report: fever, chills, sweats, weight loss, skin rashes, petechiae, bruising, eye irritation, hearing loss, mouth sores, sore throat, hemoptysis, pleuritic chest pain, dyspnea, change in vision, focal numbness/weakness, chest pains, palpitations, nausea, vomiting, diarrhea, constipation, dysuria, hematuria, bowel or bladder incontinence, severe joint pain, back pain.   Medications Tafa (single agent) 100units Humalin QHS, and then 80 Humalin if BG<200 and 100 Humalin if BG>200 Lantus  Trulicity 4.5 weekly   Examination: Hussein is in no acute distress No occiput, poster cervical, anterior cervical, submandibular, sublingual, submental, supraclavicular nor axillary adenopathy Lungs: CTA with breath sounds throughout Cardiac: without rubs Abd: soft and non-tender, Traube's space is tympanitic No inguinal nor femoral adenopathy Gait: using a walker.    12/16/24: WBC   7.4, Hgb 11.6, ,000 01/13/25: WBC 10.5, Hgb 12.9, ,000 03/10/25: WBC  7.5,  Hgb 12.4, ,000 04/07/25: WBC  8.0,  Hgb 14.1, ,000 04/07/25: WBC  7.7,  Hgb 12.7, ,000

## 2025-05-05 NOTE — BEGINNING OF VISIT
[0] : 2) Feeling down, depressed, or hopeless: Not at all (0) [Never] : Never [Date Discussed (MM/DD/YY): ___] : Discussed: [unfilled] [With Patient/Caregiver] : with Patient/Caregiver [[id number]] : 0

## 2025-05-05 NOTE — BEGINNING OF VISIT
[0] : 2) Feeling down, depressed, or hopeless: Not at all (0) [Never] : Never [Date Discussed (MM/DD/YY): ___] : Discussed: [unfilled] [With Patient/Caregiver] : with Patient/Caregiver [[id number]] : 0

## 2025-05-05 NOTE — ASSESSMENT
[With Patient/Caregiver] : With Patient/Caregiver [Designated Health Care Proxy] : Designated Health Care Proxy [DNR] : DNR [DNI] : DNI [FreeTextEntry1] : Assessment: 52-year-old, developmentally delayed, day +15 cycle 14 Tafa for primary refractory DLBCL.   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 (2/1/24): total protein 124. This is a marked decrease from Dx and supports CNS involvement of lymphoma at Dx. RCHOP (cycle 4): 4/4/24 Tafa/Rev: 4/15/24 -  Hussein will not consider hospitalization -- even for one day and therefore his decision maker does not wish to change to bi-specific therapy  PMHx: HTN, AODM, left AKA, right transmetarsal amputation, diabetic neuropathy.   Plan: Heme: Single agent Tafa starting C13 Q14 days  Clonoseq: clone tracking is not feasible   Radiographs: will repeat scans at the end of July  DM: being managed by PCP; but will add baby ASA secondary to AODM at this time.    Code Status:  DNR/DNI  Over 40 minutes were spent in direct patient care with greater than 50% discussing his ROS.   Addendum I CT (3/24/25) CHEST: LUNGS AND LARGE AIRWAYS: Patent central airways. No pulmonary nodules. Minor atelectasis at the left lung base. PLEURA: Minimal left pleural effusion, decreased in size. VESSELS: Within normal limits. HEART: Heart size is normal. No pericardial effusion. MEDIASTINUM AND RAMIREZ: No lymphadenopathy. CHEST WALL AND LOWER NECK: Bilateral gynecomastia.  ABDOMEN AND PELVIS: LIVER: Enlarged. 21.6 cm in craniocaudal diameter. Fatty infiltration with focal sparing adjacent to the gallbladder fossa. BILE DUCTS: Normal caliber. GALLBLADDER: Layering biliary sludge. SPLEEN: Splenic lesion measuring 3.3 x 2.6 cm, decreased in size (previously 3.5 x 3.2 cm). Normal size. 10.8 cm in craniocaudal diameter. PANCREAS: Within normal limits. ADRENALS: Within normal limits. KIDNEYS/URETERS: Within normal limits.   BOWEL: No bowel obstruction. Appendix is normal. PERITONEUM/RETROPERITONEUM: Retroperitoneal density inseparable from the celiac axis and left adrenal gland has decreased in size now measuring 2.7 x 0.9 cm (3.5 x 0.9 cm when remeasured along similar axes on prior exam) VESSELS: Atherosclerotic changes. LYMPH NODES: No lymphadenopathy. ABDOMINAL WALL: Fat-containing umbilical hernia. BONES: Degenerative changes.  IMPRESSION: Decrease in size of splenic lesion and retroperitoneal soft tissue density as compared to prior examination 12/30/2024  Minimal left pleural effusion, decreased in size.  Enlarged, fatty liver. [AdvancecareDate] : 07/29/24

## 2025-05-05 NOTE — BEGINNING OF VISIT
[0] : 2) Feeling down, depressed, or hopeless: Not at all (0) [Never] : Never [Date Discussed (MM/DD/YY): ___] : Discussed: [unfilled] [With Patient/Caregiver] : with Patient/Caregiver [[id number]] : 0

## 2025-05-05 NOTE — HISTORY OF PRESENT ILLNESS
[de-identified] : Hussein was last seen: 4/7/25 Presents with his brother: decision maker.  [de-identified] : Reason for visit: refractory DLBCL  Since last visit: Overall feeling well.   Hussein does not spontaneously report: fever, chills, sweats, weight loss, skin rashes, petechiae, bruising, eye irritation, hearing loss, mouth sores, sore throat, hemoptysis, pleuritic chest pain, dyspnea, change in vision, focal numbness/weakness, chest pains, palpitations, nausea, vomiting, diarrhea, constipation, dysuria, hematuria, bowel or bladder incontinence, severe joint pain, back pain.   Medications Tafa (single agent) 100units Humalin QHS, and then 80 Humalin if BG<200 and 100 Humalin if BG>200 Lantus  Trulicity 4.5 weekly   Examination: Hussein is in no acute distress No occiput, poster cervical, anterior cervical, submandibular, sublingual, submental, supraclavicular nor axillary adenopathy Lungs: CTA with breath sounds throughout Cardiac: without rubs Abd: soft and non-tender, Traube's space is tympanitic No inguinal nor femoral adenopathy Gait: using a walker.    12/16/24: WBC   7.4, Hgb 11.6, ,000 01/13/25: WBC 10.5, Hgb 12.9, ,000 03/10/25: WBC  7.5,  Hgb 12.4, ,000 04/07/25: WBC  8.0,  Hgb 14.1, ,000 04/07/25: WBC  7.7,  Hgb 12.7, ,000

## 2025-05-05 NOTE — HISTORY OF PRESENT ILLNESS
[de-identified] : Hussein was last seen: 4/7/25 Presents with his brother: decision maker.  [de-identified] : Reason for visit: refractory DLBCL  Since last visit: Overall feeling well.   Hussein does not spontaneously report: fever, chills, sweats, weight loss, skin rashes, petechiae, bruising, eye irritation, hearing loss, mouth sores, sore throat, hemoptysis, pleuritic chest pain, dyspnea, change in vision, focal numbness/weakness, chest pains, palpitations, nausea, vomiting, diarrhea, constipation, dysuria, hematuria, bowel or bladder incontinence, severe joint pain, back pain.   Medications Tafa (single agent) 100units Humalin QHS, and then 80 Humalin if BG<200 and 100 Humalin if BG>200 Lantus  Trulicity 4.5 weekly   Examination: Hussein is in no acute distress No occiput, poster cervical, anterior cervical, submandibular, sublingual, submental, supraclavicular nor axillary adenopathy Lungs: CTA with breath sounds throughout Cardiac: without rubs Abd: soft and non-tender, Traube's space is tympanitic No inguinal nor femoral adenopathy Gait: using a walker.    12/16/24: WBC   7.4, Hgb 11.6, ,000 01/13/25: WBC 10.5, Hgb 12.9, ,000 03/10/25: WBC  7.5,  Hgb 12.4, ,000 04/07/25: WBC  8.0,  Hgb 14.1, ,000 04/07/25: WBC  7.7,  Hgb 12.7, ,000

## 2025-05-05 NOTE — BEGINNING OF VISIT
[0] : 2) Feeling down, depressed, or hopeless: Not at all (0) [Never] : Never [Date Discussed (MM/DD/YY): ___] : Discussed: [unfilled] [With Patient/Caregiver] : with Patient/Caregiver [[id number]] : 0

## 2025-06-02 NOTE — HISTORY OF PRESENT ILLNESS
[de-identified] : Hussein was last seen: 5/05/2025 Presents with his brother: decision maker.  [de-identified] : Reason for visit: refractory DLBCL  Since last visit: Overall feeling well.   Hussein does not spontaneously report: fever, chills, sweats, weight loss, skin rashes, petechiae, bruising, eye irritation, hearing loss, mouth sores, sore throat, hemoptysis, pleuritic chest pain, dyspnea, change in vision, focal numbness/weakness, chest pains, palpitations, nausea, vomiting, diarrhea, constipation, dysuria, hematuria, bowel or bladder incontinence, severe joint pain, back pain.   Medications Tafa (single agent) 100units Humalin QHS, and then 80 Humalin if BG<200 and 100 Humalin if BG>200 Lantus  Trulicity 4.5 weekly   Examination: Hussein is in no acute distress No occiput, poster cervical, anterior cervical, submandibular, sublingual, submental, supraclavicular nor axillary adenopathy Lungs: CTA with breath sounds throughout Cardiac: without rubs Abd: soft and non-tender, Traube's space is tympanitic No inguinal nor femoral adenopathy Gait: using a walker.    12/16/24: WBC   7.4, Hgb 11.6, ,000 01/13/25: WBC 10.5, Hgb 12.9, ,000 03/10/25: WBC  7.5,  Hgb 12.4, ,000 04/07/25: WBC  8.0,  Hgb 14.1, ,000 04/07/25: WBC  7.7,  Hgb 12.7, ,000

## 2025-06-02 NOTE — ASSESSMENT
[With Patient/Caregiver] : With Patient/Caregiver [Designated Health Care Proxy] : Designated Health Care Proxy [DNR] : DNR [DNI] : DNI [FreeTextEntry1] : Assessment: 52-year-old, developmentally delayed, day +15 cycle 15 Tafa for primary refractory DLBCL.   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 (2/1/24): total protein 124. This is a marked decrease from Dx and supports CNS involvement of lymphoma at Dx. RCHOP (cycle 4): 4/4/24 Tafa/Rev: 4/15/24 -  Hussein will not consider hospitalization -- even for one day and therefore his decision maker does not wish to change to bi-specific therapy  PMHx: HTN, AODM, left AKA, right transmetarsal amputation, diabetic neuropathy.   Plan: Heme: Single agent Tafa starting C13 Q14 days  Clonoseq: clone tracking is not feasible   Radiographs: will repeat scans at the end of July  DM: being managed by PCP; but will add baby ASA secondary to AODM at this time.    Code Status:  DNR/DNI   Addendum I CT (3/24/25) CHEST: LUNGS AND LARGE AIRWAYS: Patent central airways. No pulmonary nodules. Minor atelectasis at the left lung base. PLEURA: Minimal left pleural effusion, decreased in size. VESSELS: Within normal limits. HEART: Heart size is normal. No pericardial effusion. MEDIASTINUM AND RAMIREZ: No lymphadenopathy. CHEST WALL AND LOWER NECK: Bilateral gynecomastia.  ABDOMEN AND PELVIS: LIVER: Enlarged. 21.6 cm in craniocaudal diameter. Fatty infiltration with focal sparing adjacent to the gallbladder fossa. BILE DUCTS: Normal caliber. GALLBLADDER: Layering biliary sludge. SPLEEN: Splenic lesion measuring 3.3 x 2.6 cm, decreased in size (previously 3.5 x 3.2 cm). Normal size. 10.8 cm in craniocaudal diameter. PANCREAS: Within normal limits. ADRENALS: Within normal limits. KIDNEYS/URETERS: Within normal limits.   BOWEL: No bowel obstruction. Appendix is normal. PERITONEUM/RETROPERITONEUM: Retroperitoneal density inseparable from the celiac axis and left adrenal gland has decreased in size now measuring 2.7 x 0.9 cm (3.5 x 0.9 cm when remeasured along similar axes on prior exam) VESSELS: Atherosclerotic changes. LYMPH NODES: No lymphadenopathy. ABDOMINAL WALL: Fat-containing umbilical hernia. BONES: Degenerative changes.  IMPRESSION: Decrease in size of splenic lesion and retroperitoneal soft tissue density as compared to prior examination 12/30/2024  Minimal left pleural effusion, decreased in size.  Enlarged, fatty liver. [AdvancecareDate] : 07/29/24

## 2025-07-01 NOTE — ASSESSMENT
[With Patient/Caregiver] : With Patient/Caregiver [Designated Health Care Proxy] : Designated Health Care Proxy [DNR] : DNR [DNI] : DNI [FreeTextEntry1] : Assessment: 52-year-old, developmentally delayed, day +15 cycle 16 Tafa for primary refractory DLBCL.   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 (2/1/24): total protein 124. This is a marked decrease from Dx and supports CNS involvement of lymphoma at Dx. RCHOP (cycle 4): 4/4/24 Tafa/Rev: 4/15/24 -  Hussein will not consider hospitalization -- even for one day and therefore his decision maker does not wish to change to bi-specific therapy  PMHx: HTN, AODM, left AKA, right transmetarsal amputation, diabetic neuropathy.   Plan: Heme: Single agent Tafa starting C13 Q14 days  Clonoseq: clone tracking is not feasible   Radiographs: will repeat scans   DM: being managed by PCP; but will add baby ASA secondary to AODM at this time.    Code Status:  DNR/DNI   Addendum I CT (3/24/25) CHEST: LUNGS AND LARGE AIRWAYS: Patent central airways. No pulmonary nodules. Minor atelectasis at the left lung base. PLEURA: Minimal left pleural effusion, decreased in size. VESSELS: Within normal limits. HEART: Heart size is normal. No pericardial effusion. MEDIASTINUM AND RAMIREZ: No lymphadenopathy. CHEST WALL AND LOWER NECK: Bilateral gynecomastia.  ABDOMEN AND PELVIS: LIVER: Enlarged. 21.6 cm in craniocaudal diameter. Fatty infiltration with focal sparing adjacent to the gallbladder fossa. BILE DUCTS: Normal caliber. GALLBLADDER: Layering biliary sludge. SPLEEN: Splenic lesion measuring 3.3 x 2.6 cm, decreased in size (previously 3.5 x 3.2 cm). Normal size. 10.8 cm in craniocaudal diameter. PANCREAS: Within normal limits. ADRENALS: Within normal limits. KIDNEYS/URETERS: Within normal limits.   BOWEL: No bowel obstruction. Appendix is normal. PERITONEUM/RETROPERITONEUM: Retroperitoneal density inseparable from the celiac axis and left adrenal gland has decreased in size now measuring 2.7 x 0.9 cm (3.5 x 0.9 cm when remeasured along similar axes on prior exam) VESSELS: Atherosclerotic changes. LYMPH NODES: No lymphadenopathy. ABDOMINAL WALL: Fat-containing umbilical hernia. BONES: Degenerative changes.  IMPRESSION: Decrease in size of splenic lesion and retroperitoneal soft tissue density as compared to prior examination 12/30/2024  Minimal left pleural effusion, decreased in size.  Enlarged, fatty liver. [AdvancecareDate] : 07/29/24

## 2025-07-01 NOTE — HISTORY OF PRESENT ILLNESS
[de-identified] : Hussein was last seen: 6/02/2025 Presents with his brother: decision maker.  [de-identified] : Reason for visit: refractory DLBCL  Since last visit: Overall feeling well.   Hussein does not spontaneously report: fever, chills, sweats, weight loss, skin rashes, petechiae, bruising, eye irritation, hearing loss, mouth sores, sore throat, hemoptysis, pleuritic chest pain, dyspnea, change in vision, focal numbness/weakness, chest pains, palpitations, nausea, vomiting, diarrhea, constipation, dysuria, hematuria, bowel or bladder incontinence, severe joint pain, back pain.   Medications Tafa (single agent) ASA 81mg daily  100units Humalin QHS, and then 80 Humalin if BG<200 and 100 Humalin if BG>200 Lantus  Trulicity 4.5 weekly   Examination: Hussein is in no acute distress No occiput, poster cervical, anterior cervical, submandibular, sublingual, submental, supraclavicular nor axillary adenopathy Lungs: CTA with breath sounds throughout Cardiac: without rubs Abd: soft and non-tender, Traube's space is tympanitic No inguinal nor femoral adenopathy Gait: using a walker.    12/16/24: WBC   7.4, Hgb 11.6, ,000 01/13/25: WBC 10.5, Hgb 12.9, ,000 03/10/25: WBC  7.5,  Hgb 12.4, ,000 04/07/25: WBC  8.0,  Hgb 14.1, ,000 04/07/25: WBC  7.7,  Hgb 12.7, ,000

## 2025-07-28 NOTE — HISTORY OF PRESENT ILLNESS
[de-identified] : Hussein was last seen: 7/30/2025 Presents with his brother: decision maker.  [de-identified] : Reason for visit: refractory DLBCL  Since last visit: Overall feeling well. Reports blood sugars have been under control, but today was 200 does not adjust his Humalin based on glucose.   Hussein does not spontaneously report: fever, chills, sweats, weight loss, skin rashes, petechiae, bruising, eye irritation, hearing loss, mouth sores, sore throat, hemoptysis, pleuritic chest pain, dyspnea, change in vision, focal numbness/weakness, chest pains, palpitations, nausea, vomiting, diarrhea, constipation, dysuria, hematuria, bowel or bladder incontinence, severe joint pain, back pain.   Medications Tafa (single agent) ASA 81mg daily  100units Humalin QHS, and then 80 Humalin if BG<200 and 100 Humalin if BG>200 Trulicity 4.5 weekly   Examination: Hussein is in no acute distress No occiput, poster cervical, anterior cervical, submandibular, sublingual, submental, supraclavicular nor axillary adenopathy Lungs: CTA with breath sounds throughout Cardiac: without rubs Abd: soft and non-tender, Traube's space is tympanitic No inguinal nor femoral adenopathy Gait: using a walker.

## 2025-07-28 NOTE — ASSESSMENT
[With Patient/Caregiver] : With Patient/Caregiver [Designated Health Care Proxy] : Designated Health Care Proxy [DNR] : DNR [DNI] : DNI [FreeTextEntry1] : Assessment: 52-year-old, developmentally delayed, day +15 cycle 17 Tafa for primary refractory DLBCL.   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 (2/1/24): total protein 124. This is a marked decrease from Dx and supports CNS involvement of lymphoma at Dx. RCHOP (cycle 4): 4/4/24 Tafa/Rev: 4/15/24 -  Hussein will not consider hospitalization -- even for one day and therefore his decision maker does not wish to change to bi-specific therapy  PMHx: HTN, AODM, left AKA, right transmetarsal amputation, diabetic neuropathy.   Plan: Heme: Single agent Tafa starting C13 Q14 days  Clonoseq: clone tracking is not feasible   Radiographs: will repeat scans after completion of C19 prior to C20 (ending of Sept)  DM: being managed by PCP; but will add baby ASA secondary to AODM at this time.    Code Status:  DNR/DNI  RTC in 2 weeks for tx and then 4 weeks for tx and MD visit    Addendum I CT (3/24/25) CHEST: LUNGS AND LARGE AIRWAYS: Patent central airways. No pulmonary nodules. Minor atelectasis at the left lung base. PLEURA: Minimal left pleural effusion, decreased in size. VESSELS: Within normal limits. HEART: Heart size is normal. No pericardial effusion. MEDIASTINUM AND RAMIREZ: No lymphadenopathy. CHEST WALL AND LOWER NECK: Bilateral gynecomastia.  ABDOMEN AND PELVIS: LIVER: Enlarged. 21.6 cm in craniocaudal diameter. Fatty infiltration with focal sparing adjacent to the gallbladder fossa. BILE DUCTS: Normal caliber. GALLBLADDER: Layering biliary sludge. SPLEEN: Splenic lesion measuring 3.3 x 2.6 cm, decreased in size (previously 3.5 x 3.2 cm). Normal size. 10.8 cm in craniocaudal diameter. PANCREAS: Within normal limits. ADRENALS: Within normal limits. KIDNEYS/URETERS: Within normal limits.   BOWEL: No bowel obstruction. Appendix is normal. PERITONEUM/RETROPERITONEUM: Retroperitoneal density inseparable from the celiac axis and left adrenal gland has decreased in size now measuring 2.7 x 0.9 cm (3.5 x 0.9 cm when remeasured along similar axes on prior exam) VESSELS: Atherosclerotic changes. LYMPH NODES: No lymphadenopathy. ABDOMINAL WALL: Fat-containing umbilical hernia. BONES: Degenerative changes.  IMPRESSION: Decrease in size of splenic lesion and retroperitoneal soft tissue density as compared to prior examination 12/30/2024  Minimal left pleural effusion, decreased in size.  Enlarged, fatty liver. [AdvancecareDate] : 07/29/24